# Patient Record
Sex: FEMALE | Race: WHITE | Employment: UNEMPLOYED | URBAN - METROPOLITAN AREA
[De-identification: names, ages, dates, MRNs, and addresses within clinical notes are randomized per-mention and may not be internally consistent; named-entity substitution may affect disease eponyms.]

---
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
## 2023-09-22 DIAGNOSIS — N94.6 DYSMENORRHEA: ICD-10-CM

## 2023-09-22 RX ORDER — NORGESTIMATE AND ETHINYL ESTRADIOL 0.25-0.035
KIT ORAL
Qty: 84 TABLET | Refills: 3 | OUTPATIENT
Start: 2023-09-22

## 2023-09-22 RX ORDER — NORGESTIMATE AND ETHINYL ESTRADIOL 0.25-0.035
KIT ORAL
Qty: 84 TABLET | Refills: 3 | Status: SHIPPED | OUTPATIENT
Start: 2023-09-22

## 2023-09-22 NOTE — TELEPHONE ENCOUNTER
GYN pt with an annual scheduled 11/22 calling in for a refill on her [facility] SYSTEM to hold her over until she is back in town as she is in college and that is the soonest she can get in. Pt is using the [facility] in [address].

## 2023-11-22 ENCOUNTER — OFFICE VISIT (OUTPATIENT)
Dept: OBGYN CLINIC | Age: 18
End: 2023-11-22
Payer: COMMERCIAL

## 2023-11-22 ENCOUNTER — HOSPITAL ENCOUNTER (OUTPATIENT)
Age: 18
Setting detail: SPECIMEN
Discharge: HOME | End: 2023-11-22

## 2023-11-22 VITALS
SYSTOLIC BLOOD PRESSURE: 133 MMHG | DIASTOLIC BLOOD PRESSURE: 76 MMHG | BODY MASS INDEX: 32.56 KG/M2 | WEIGHT: 178 LBS | HEART RATE: 75 BPM

## 2023-11-22 DIAGNOSIS — Z11.3 ROUTINE SCREENING FOR STI (SEXUALLY TRANSMITTED INFECTION): ICD-10-CM

## 2023-11-22 DIAGNOSIS — Z01.419 WOMEN'S ANNUAL ROUTINE GYNECOLOGICAL EXAMINATION: Primary | ICD-10-CM

## 2023-11-22 DIAGNOSIS — N94.6 DYSMENORRHEA: ICD-10-CM

## 2023-11-22 DIAGNOSIS — G43.829 MENSTRUAL MIGRAINE WITHOUT STATUS MIGRAINOSUS, NOT INTRACTABLE: ICD-10-CM

## 2023-11-22 PROCEDURE — 99395 PREV VISIT EST AGE 18-39: CPT | Performed by: NURSE PRACTITIONER

## 2023-11-22 RX ORDER — SUMATRIPTAN 50 MG/1
50 TABLET, FILM COATED ORAL EVERY 8 HOURS PRN
Qty: 30 TABLET | Refills: 3 | Status: SHIPPED | OUTPATIENT
Start: 2023-11-22

## 2023-11-22 RX ORDER — NORGESTIMATE AND ETHINYL ESTRADIOL 0.25-0.035
KIT ORAL
Qty: 84 TABLET | Refills: 3 | Status: SHIPPED | OUTPATIENT
Start: 2023-11-22

## 2023-11-22 ASSESSMENT — ENCOUNTER SYMPTOMS
SHORTNESS OF BREATH: 0
BACK PAIN: 0
GASTROINTESTINAL NEGATIVE: 1
ALLERGIC/IMMUNOLOGIC NEGATIVE: 1
RESPIRATORY NEGATIVE: 1
COUGH: 0
ABDOMINAL DISTENTION: 0

## 2023-11-22 NOTE — PROGRESS NOTES
[address] [place] [address]  [facility] Ann Mcpherson  [address] [place] [address] JEANMARIE Gonzalez  Dept: [phone]      11/22/23    Kumar Bah       Gyn Annual Exam      CHIEF COMPLAINT:    Chief Complaint   Patient presents with    Annual Exam                Blood pressure 133/76, pulse 75, weight 80.7 kg (178 lb), not currently breastfeeding. HPI :   Kumar Bah 2005 is a 19 y.o. [place]  who is here for her annual exam. She is on ocps to control dysmenorrhea. She continues to have one day of heavy flow and cramping but it is improved. She does get get a migraine on day 1. She is not interested in an IUD      Periods are monthly, occurring every 28 days and lasting 4 days. HMB- day 1  Dysmenorrhea- days    Pre-med at [facility]  _____________________________________________________________________  Past Medical History:   Diagnosis Date    Dysmenorrhea     Exercise-induced asthma                                                                    History reviewed. No pertinent surgical history.   Family History   Problem Relation Age of Onset    Mult Sclerosis Mother     Thyroid Cancer Mother     High Cholesterol Father     Mult Sclerosis Maternal Grandmother     Cancer Paternal Grandmother         breast     Cancer Paternal Grandfather         leukemia    Colon Cancer Neg Hx     Uterine Cancer Neg Hx     Ovarian Cancer Neg Hx      Social History     Tobacco Use   Smoking Status Never   Smokeless Tobacco Never     Social History     Substance and Sexual Activity   Alcohol Use No     Current Outpatient Medications   Medication Sig Dispense Refill    SUMAtriptan (IMITREX) 50 MG tablet Take 1 tablet by mouth every 8 hours as needed for Migraine 30 tablet 3    norgestimate-ethinyl estradiol (SPRINTEC 28) 0.25-35 MG-MCG per tablet TAKE ONE TABLET BY MOUTH DAILY 84 tablet 3    fluticasone (FLONASE) 50 MCG/ACT nasal spray 2 sprays by Nasal route daily 1 each 0

## 2023-11-24 LAB
CHLAMYDIA DNA UR QL NAA+PROBE: NEGATIVE
N GONORRHOEA DNA UR QL NAA+PROBE: NEGATIVE
SPECIMEN DESCRIPTION: NORMAL

## 2024-11-01 DIAGNOSIS — N94.6 DYSMENORRHEA: ICD-10-CM

## 2024-11-01 RX ORDER — NORGESTIMATE AND ETHINYL ESTRADIOL 0.25-0.035
KIT ORAL
Qty: 84 TABLET | Refills: 0 | Status: SHIPPED | OUTPATIENT
Start: 2024-11-01

## 2025-01-02 NOTE — PROGRESS NOTES
Raina Rayo is a 19 y.o. female who presents in office today with Self establish new care with office. Previous PCP was Jannette Lopes    Chief Complaint   Patient presents with    New Patient     Previous PCP DARCY Fajardo     [facility]- therapist         History of Present Illness:     HPI    Patient here to establish care, previous PCP was Jannette Lopes.  Last office visit was March 2020.  Currently goes to [employer], she is studying health science, and plans to obtain her masters in nursing.    History of asthma, not on any controller medications.  Tells me she has albuterol as needed, she thinks the last time she took it was last summer.    History of reflux, stable off of medications.    Has followed with OB/GYN in the past, prescribed birth control, and Imitrex for migraines.  Tells me she is planning to make an appointment so she can have her Pap.    Biggest concern today is anxiety and depression, tells me she has had anxiety since COVID, and depression since the fall 2024.  Tells me most of her anxiety revolves around life and career changes, her health, people dying, and think she cannot control.  She has seen a therapist at [employer], who helped her find referrals in [place],.  She denies ever being on medications for anxiety or depression in the past, but thinks it is time to try something.  Tells me she has had physical symptoms of panic attacks including palpitations, nausea and vomiting.  She also tells me she has had chronic diarrhea, and insomnia that both seem to be related to her anxiety.  She has used NyQuil and melatonin in the past to help with the insomnia.  Tells me she has also noticed that her toes go numb randomly, she denies any discolorations, but also thinks this has to do with anxiety as it does occur when she is in high stress situations.    Care gaps:   Colon CA screening: N/A  Vaccines: Flu today  Hepatitis C/HIV screens:   Breast CA

## 2025-01-10 ENCOUNTER — OFFICE VISIT (OUTPATIENT)
Dept: FAMILY MEDICINE CLINIC | Age: 20
End: 2025-01-10

## 2025-01-10 VITALS
BODY MASS INDEX: 28.2 KG/M2 | SYSTOLIC BLOOD PRESSURE: 120 MMHG | OXYGEN SATURATION: 99 % | RESPIRATION RATE: 12 BRPM | TEMPERATURE: 97.8 F | DIASTOLIC BLOOD PRESSURE: 76 MMHG | HEART RATE: 72 BPM | WEIGHT: 154.2 LBS

## 2025-01-10 DIAGNOSIS — F32.2 CURRENT SEVERE EPISODE OF MAJOR DEPRESSIVE DISORDER WITHOUT PSYCHOTIC FEATURES, UNSPECIFIED WHETHER RECURRENT (HCC): ICD-10-CM

## 2025-01-10 DIAGNOSIS — N94.6 DYSMENORRHEA: ICD-10-CM

## 2025-01-10 DIAGNOSIS — Z23 NEED FOR INFLUENZA VACCINATION: ICD-10-CM

## 2025-01-10 DIAGNOSIS — K21.9 GASTROESOPHAGEAL REFLUX DISEASE WITHOUT ESOPHAGITIS: ICD-10-CM

## 2025-01-10 DIAGNOSIS — R19.7 DIARRHEA, UNSPECIFIED TYPE: ICD-10-CM

## 2025-01-10 DIAGNOSIS — F41.9 ANXIETY: ICD-10-CM

## 2025-01-10 DIAGNOSIS — R20.0 NUMBNESS OF TOES: ICD-10-CM

## 2025-01-10 DIAGNOSIS — J45.20 MILD INTERMITTENT ASTHMA WITHOUT COMPLICATION: ICD-10-CM

## 2025-01-10 DIAGNOSIS — G47.00 INSOMNIA, UNSPECIFIED TYPE: ICD-10-CM

## 2025-01-10 DIAGNOSIS — Z76.89 ENCOUNTER TO ESTABLISH CARE: Primary | ICD-10-CM

## 2025-01-10 RX ORDER — ACETAMINOPHEN 500 MG
1 TABLET ORAL DAILY
Qty: 14 CAPSULE | Refills: 0 | Status: SHIPPED | OUTPATIENT
Start: 2025-01-10

## 2025-01-10 RX ORDER — ESCITALOPRAM OXALATE 10 MG/1
10 TABLET ORAL DAILY
Qty: 30 TABLET | Refills: 3 | Status: SHIPPED | OUTPATIENT
Start: 2025-01-10

## 2025-01-10 ASSESSMENT — ENCOUNTER SYMPTOMS
VOMITING: 1
NAUSEA: 1
DIARRHEA: 1

## 2025-01-10 ASSESSMENT — ANXIETY QUESTIONNAIRES
1. FEELING NERVOUS, ANXIOUS, OR ON EDGE: NEARLY EVERY DAY
3. WORRYING TOO MUCH ABOUT DIFFERENT THINGS: NEARLY EVERY DAY
GAD7 TOTAL SCORE: 18
IF YOU CHECKED OFF ANY PROBLEMS ON THIS QUESTIONNAIRE, HOW DIFFICULT HAVE THESE PROBLEMS MADE IT FOR YOU TO DO YOUR WORK, TAKE CARE OF THINGS AT HOME, OR GET ALONG WITH OTHER PEOPLE: EXTREMELY DIFFICULT
6. BECOMING EASILY ANNOYED OR IRRITABLE: SEVERAL DAYS
5. BEING SO RESTLESS THAT IT IS HARD TO SIT STILL: MORE THAN HALF THE DAYS
2. NOT BEING ABLE TO STOP OR CONTROL WORRYING: NEARLY EVERY DAY
7. FEELING AFRAID AS IF SOMETHING AWFUL MIGHT HAPPEN: NEARLY EVERY DAY
4. TROUBLE RELAXING: NEARLY EVERY DAY

## 2025-01-10 ASSESSMENT — PATIENT HEALTH QUESTIONNAIRE - PHQ9
6. FEELING BAD ABOUT YOURSELF - OR THAT YOU ARE A FAILURE OR HAVE LET YOURSELF OR YOUR FAMILY DOWN: NEARLY EVERY DAY
5. POOR APPETITE OR OVEREATING: NEARLY EVERY DAY
7. TROUBLE CONCENTRATING ON THINGS, SUCH AS READING THE NEWSPAPER OR WATCHING TELEVISION: MORE THAN HALF THE DAYS
SUM OF ALL RESPONSES TO PHQ QUESTIONS 1-9: 18
SUM OF ALL RESPONSES TO PHQ QUESTIONS 1-9: 18
9. THOUGHTS THAT YOU WOULD BE BETTER OFF DEAD, OR OF HURTING YOURSELF: NOT AT ALL
8. MOVING OR SPEAKING SO SLOWLY THAT OTHER PEOPLE COULD HAVE NOTICED. OR THE OPPOSITE, BEING SO FIGETY OR RESTLESS THAT YOU HAVE BEEN MOVING AROUND A LOT MORE THAN USUAL: SEVERAL DAYS
10. IF YOU CHECKED OFF ANY PROBLEMS, HOW DIFFICULT HAVE THESE PROBLEMS MADE IT FOR YOU TO DO YOUR WORK, TAKE CARE OF THINGS AT HOME, OR GET ALONG WITH OTHER PEOPLE: VERY DIFFICULT
4. FEELING TIRED OR HAVING LITTLE ENERGY: NEARLY EVERY DAY
SUM OF ALL RESPONSES TO PHQ QUESTIONS 1-9: 18
3. TROUBLE FALLING OR STAYING ASLEEP: NEARLY EVERY DAY
SUM OF ALL RESPONSES TO PHQ QUESTIONS 1-9: 18
SUM OF ALL RESPONSES TO PHQ9 QUESTIONS 1 & 2: 3
2. FEELING DOWN, DEPRESSED OR HOPELESS: SEVERAL DAYS

## 2025-01-24 DIAGNOSIS — N94.6 DYSMENORRHEA: ICD-10-CM

## 2025-01-24 RX ORDER — NORGESTIMATE AND ETHINYL ESTRADIOL 0.25-0.035
KIT ORAL
Qty: 84 TABLET | Refills: 0 | OUTPATIENT
Start: 2025-01-24

## 2025-01-31 ENCOUNTER — PATIENT MESSAGE (OUTPATIENT)
Dept: OBGYN CLINIC | Age: 20
End: 2025-01-31

## 2025-01-31 DIAGNOSIS — N94.6 DYSMENORRHEA: ICD-10-CM

## 2025-01-31 RX ORDER — NORGESTIMATE AND ETHINYL ESTRADIOL 0.25-0.035
KIT ORAL
Qty: 84 TABLET | Refills: 0 | Status: SHIPPED | OUTPATIENT
Start: 2025-01-31

## 2025-02-12 NOTE — PROGRESS NOTES
Raina Rayo is a 20 y.o. female who presents in office today with Self medication specific follow up    Chief Complaint   Patient presents with    Anxiety     Lexapro- Mild drowsiness. She is taking it at night     Depression        History of Present Illness:     HPI    Patient here for follow-up after initiating Lexapro last visit.  She has been taking this medication at night, as it does make her a little bit drowsy. Does feel that mood and anxiety have been better. Has been seeing a counselor every other week or as needed at [employer]. Does not feel that dose needs changed. Is still having trouble staying asleep, does fine falling asleep.  She tries to avoid sleep aids as much as possible.    Questions were answered about dosing of medication, and patient's concerns for serotonin syndrome if Lexapro was combined with other medications.    Care gaps:   Colon CA screening: N/A  Vaccines: Flu   Hepatitis C/HIV screens:   Breast CA screening:N/A  OB/GYN, cervical CA screening: Scheduled with OB/GYN  Depression Screenin    Health Maintenance Due   Topic Date Due    HIV screen  Never done    Hepatitis C screen  Never done    Pneumococcal 0-49 years Vaccine (1 of 2 - PCV) 2022    Pap smear  Never done    COVID-19 Vaccine (2024-25 season) 2024    Chlamydia/GC screen  2024        Patient Care Team:  Inna Mcclendon APRN - CNP as PCP - General (Nurse Practitioner)  Inna Mcclendon APRN - CNP as PCP - Empaneled Provider    Reviewed     [x] Past Medical, Family, and Social History was reviewed per writer and does contribute to the patient presenting condition.    [x] Laboratory Results, Vital signs, Imaging, Active Problems, Immunizations, Current/Recently Discontinued Medications, Health Maintenance Activities Due, Referral Notes (if available) were reviewed per writer     [x] Reviewed Depression screening if taken or valid today or any other valid screening tool (others seen below)

## 2025-02-21 ENCOUNTER — OFFICE VISIT (OUTPATIENT)
Dept: FAMILY MEDICINE CLINIC | Age: 20
End: 2025-02-21
Payer: COMMERCIAL

## 2025-02-21 VITALS
OXYGEN SATURATION: 98 % | RESPIRATION RATE: 12 BRPM | HEART RATE: 68 BPM | SYSTOLIC BLOOD PRESSURE: 120 MMHG | WEIGHT: 155 LBS | DIASTOLIC BLOOD PRESSURE: 82 MMHG | BODY MASS INDEX: 28.35 KG/M2 | TEMPERATURE: 97.2 F

## 2025-02-21 DIAGNOSIS — F41.9 ANXIETY: Primary | ICD-10-CM

## 2025-02-21 DIAGNOSIS — F32.2 CURRENT SEVERE EPISODE OF MAJOR DEPRESSIVE DISORDER WITHOUT PSYCHOTIC FEATURES, UNSPECIFIED WHETHER RECURRENT (HCC): ICD-10-CM

## 2025-02-21 PROCEDURE — 99213 OFFICE O/P EST LOW 20 MIN: CPT

## 2025-02-21 RX ORDER — ESCITALOPRAM OXALATE 10 MG/1
10 TABLET ORAL DAILY
Qty: 30 TABLET | Refills: 1 | Status: SHIPPED | OUTPATIENT
Start: 2025-02-21

## 2025-02-21 SDOH — ECONOMIC STABILITY: FOOD INSECURITY: WITHIN THE PAST 12 MONTHS, THE FOOD YOU BOUGHT JUST DIDN'T LAST AND YOU DIDN'T HAVE MONEY TO GET MORE.: NEVER TRUE

## 2025-02-21 SDOH — ECONOMIC STABILITY: INCOME INSECURITY: IN THE LAST 12 MONTHS, WAS THERE A TIME WHEN YOU WERE NOT ABLE TO PAY THE MORTGAGE OR RENT ON TIME?: NO

## 2025-02-21 SDOH — ECONOMIC STABILITY: TRANSPORTATION INSECURITY
IN THE PAST 12 MONTHS, HAS LACK OF TRANSPORTATION KEPT YOU FROM MEETINGS, WORK, OR FROM GETTING THINGS NEEDED FOR DAILY LIVING?: NO

## 2025-02-21 SDOH — ECONOMIC STABILITY: FOOD INSECURITY: WITHIN THE PAST 12 MONTHS, YOU WORRIED THAT YOUR FOOD WOULD RUN OUT BEFORE YOU GOT MONEY TO BUY MORE.: NEVER TRUE

## 2025-02-21 SDOH — ECONOMIC STABILITY: TRANSPORTATION INSECURITY
IN THE PAST 12 MONTHS, HAS THE LACK OF TRANSPORTATION KEPT YOU FROM MEDICAL APPOINTMENTS OR FROM GETTING MEDICATIONS?: NO

## 2025-02-27 NOTE — PROGRESS NOTES
[facility]  [address]  [address]  [place] [address]  Dept: [phone]      25    Raina Rayo       Gyn Annual Exam      CHIEF COMPLAINT:    Chief Complaint   Patient presents with    Annual Exam                Blood pressure 110/70, height 1.575 m (5' 2\"), weight 69.6 kg (153 lb 6.4 oz), last menstrual period 2025, not currently breastfeeding.     HPI :   Raina Rayo 2005 is a 20 y.o.   who is here for her annual exam. She is on ocps to control dysmenorrhea.  She continues to have some heavy flow and cramping but improved. She does get a migraine on day 1. Has declined IUD      Periods are monthly, occurring every 28 days and lasting 4-5 days.   HMB- no  Dysmenorrhea- yes day 1    Graduating from [employer] and planning a gap year.  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  _____________________________________________________________________  Past Medical History:   Diagnosis Date    Allergic rhinitis     Anxiety     Depression     Dysmenorrhea     Exercise-induced asthma                                                                    Past Surgical History:   Procedure Laterality Date    WISDOM TOOTH EXTRACTION      Four removed     Family History   Problem Relation Age of Onset    Mult Sclerosis Mother     Thyroid Cancer Mother     Cancer Mother         she had thyroid cancer as well    High Cholesterol Father     Mult Sclerosis Maternal Grandmother     Cancer Maternal Grandmother     Cancer Paternal Grandmother         breast     Breast Cancer Paternal Grandmother     Cancer Paternal Grandfather         leukemia    Colon Cancer Neg Hx     Uterine Cancer Neg Hx     Ovarian Cancer Neg Hx      Social History     Tobacco Use   Smoking Status Never   Smokeless Tobacco Never     Social History     Substance and Sexual Activity   Alcohol Use Yes    Comment: Special occasions. Not

## 2025-02-28 ENCOUNTER — OFFICE VISIT (OUTPATIENT)
Dept: OBGYN CLINIC | Age: 20
End: 2025-02-28
Payer: COMMERCIAL

## 2025-02-28 ENCOUNTER — HOSPITAL ENCOUNTER (OUTPATIENT)
Age: 20
Setting detail: SPECIMEN
Discharge: HOME | End: 2025-02-28

## 2025-02-28 VITALS
WEIGHT: 153.4 LBS | DIASTOLIC BLOOD PRESSURE: 70 MMHG | SYSTOLIC BLOOD PRESSURE: 110 MMHG | HEIGHT: 62 IN | BODY MASS INDEX: 28.23 KG/M2

## 2025-02-28 DIAGNOSIS — Z01.419 ENCOUNTER FOR GYNECOLOGICAL EXAMINATION: Primary | ICD-10-CM

## 2025-02-28 DIAGNOSIS — Z11.3 ROUTINE SCREENING FOR STI (SEXUALLY TRANSMITTED INFECTION): ICD-10-CM

## 2025-02-28 DIAGNOSIS — N94.6 DYSMENORRHEA: ICD-10-CM

## 2025-02-28 LAB
C TRACH DNA SPEC QL PROBE+SIG AMP: NORMAL
N GONORRHOEA DNA SPEC QL PROBE+SIG AMP: NORMAL
SPECIMEN DESCRIPTION: NORMAL

## 2025-02-28 PROCEDURE — 99395 PREV VISIT EST AGE 18-39: CPT | Performed by: NURSE PRACTITIONER

## 2025-02-28 RX ORDER — NORGESTIMATE AND ETHINYL ESTRADIOL 0.25-0.035
KIT ORAL
Qty: 84 TABLET | Refills: 3 | Status: SHIPPED | OUTPATIENT
Start: 2025-02-28

## 2025-02-28 ASSESSMENT — ENCOUNTER SYMPTOMS
COUGH: 0
BACK PAIN: 0
RESPIRATORY NEGATIVE: 1
SHORTNESS OF BREATH: 0
GASTROINTESTINAL NEGATIVE: 1
ABDOMINAL DISTENTION: 0
ALLERGIC/IMMUNOLOGIC NEGATIVE: 1

## 2025-03-04 LAB
C TRACH DNA SPEC QL PROBE+SIG AMP: NEGATIVE
N GONORRHOEA DNA SPEC QL PROBE+SIG AMP: NEGATIVE
SPECIMEN DESCRIPTION: NORMAL

## 2025-03-07 LAB — CYTOLOGY REPORT: NORMAL

## 2025-03-10 ENCOUNTER — RESULTS FOLLOW-UP (OUTPATIENT)
Dept: OBGYN CLINIC | Age: 20
End: 2025-03-10

## 2025-03-29 DIAGNOSIS — F41.9 ANXIETY: ICD-10-CM

## 2025-03-29 DIAGNOSIS — F32.2 CURRENT SEVERE EPISODE OF MAJOR DEPRESSIVE DISORDER WITHOUT PSYCHOTIC FEATURES, UNSPECIFIED WHETHER RECURRENT (HCC): ICD-10-CM

## 2025-03-31 RX ORDER — ESCITALOPRAM OXALATE 10 MG/1
10 TABLET ORAL DAILY
Qty: 90 TABLET | Refills: 1 | Status: SHIPPED | OUTPATIENT
Start: 2025-03-31